# Patient Record
Sex: FEMALE | Race: WHITE | NOT HISPANIC OR LATINO | Employment: FULL TIME | URBAN - METROPOLITAN AREA
[De-identification: names, ages, dates, MRNs, and addresses within clinical notes are randomized per-mention and may not be internally consistent; named-entity substitution may affect disease eponyms.]

---

## 2019-04-17 ENCOUNTER — OFFICE VISIT (OUTPATIENT)
Dept: PULMONOLOGY | Facility: CLINIC | Age: 49
End: 2019-04-17

## 2019-04-17 VITALS
HEIGHT: 65 IN | SYSTOLIC BLOOD PRESSURE: 120 MMHG | OXYGEN SATURATION: 98 % | WEIGHT: 192 LBS | BODY MASS INDEX: 31.99 KG/M2 | HEART RATE: 64 BPM | DIASTOLIC BLOOD PRESSURE: 88 MMHG | TEMPERATURE: 99 F

## 2019-04-17 DIAGNOSIS — R91.8 MULTIPLE PULMONARY NODULES DETERMINED BY COMPUTED TOMOGRAPHY OF LUNG: ICD-10-CM

## 2019-04-17 DIAGNOSIS — R91.8 ABNORMAL CT LUNG SCREENING: Primary | ICD-10-CM

## 2019-04-17 PROCEDURE — 94729 DIFFUSING CAPACITY: CPT | Performed by: INTERNAL MEDICINE

## 2019-04-17 PROCEDURE — 94375 RESPIRATORY FLOW VOLUME LOOP: CPT | Performed by: INTERNAL MEDICINE

## 2019-04-17 PROCEDURE — 99203 OFFICE O/P NEW LOW 30 MIN: CPT | Performed by: INTERNAL MEDICINE

## 2019-04-17 PROCEDURE — 94726 PLETHYSMOGRAPHY LUNG VOLUMES: CPT | Performed by: INTERNAL MEDICINE

## 2019-04-17 RX ORDER — CYANOCOBALAMIN 1000 UG/ML
INJECTION, SOLUTION INTRAMUSCULAR; SUBCUTANEOUS
Refills: 0 | COMMUNITY
Start: 2019-04-03 | End: 2019-12-10

## 2019-04-17 RX ORDER — UBIDECARENONE 100 MG
100 CAPSULE ORAL DAILY
COMMUNITY
End: 2019-07-22

## 2019-04-17 RX ORDER — TRAZODONE HYDROCHLORIDE 150 MG/1
150 TABLET ORAL
Refills: 1 | COMMUNITY
Start: 2019-04-01

## 2019-04-17 RX ORDER — MELATONIN
1000 AS NEEDED
COMMUNITY

## 2019-04-17 RX ORDER — ERGOCALCIFEROL 1.25 MG/1
50000 CAPSULE ORAL WEEKLY
Refills: 0 | COMMUNITY
Start: 2019-03-19 | End: 2019-04-17

## 2019-04-17 RX ORDER — AMOXICILLIN 875 MG/1
875 TABLET, COATED ORAL 2 TIMES DAILY
COMMUNITY
End: 2019-07-22

## 2019-04-17 NOTE — PROGRESS NOTES
Initial Pulmonary Consult Note    Subjective   Reason for consultation/Chief Complaint: Abnormal CT Chest    Italia Nagy is a 49 y.o. female is being seen for consultation today at the request of RODRIGUEZ Mccoy    History of Present Illness    Ms. Nagy is a 50yo F who is referred for an abnormal CT scan of the chest. She was experiencing right upper quadrant abdominal pain in early March and underwent a CT abdomen/pelvis. This showed multiple 2mm pulmonary nodules as well as a splenic calcification. She then underwent a dedicated CT chest which showed a few scattered 2-3mm calcified granulomas bilaterally. She is referred to Pulmonary Clinic for further evaluation.     She does not have any respiratory symptoms. She denies any shortness of breath, weight loss, cough or hemoptysis. She is a previous smoker. She smoked for approximately 3 years while in high school and quit > 20 years ago. She does have second hand smoke exposure. Her dad was diagnosed with COPD.       Active Ambulatory Problems     Diagnosis Date Noted   • Multiple pulmonary nodules determined by computed tomography of lung 04/17/2019     Resolved Ambulatory Problems     Diagnosis Date Noted   • No Resolved Ambulatory Problems     No Additional Past Medical History       No past surgical history on file.    No family history on file.    Social History     Socioeconomic History   • Marital status: Single     Spouse name: Not on file   • Number of children: Not on file   • Years of education: Not on file   • Highest education level: Not on file   Tobacco Use   • Smoking status: Former Smoker     Types: Cigarettes   • Smokeless tobacco: Never Used   • Tobacco comment: quit 25 years ago   Substance and Sexual Activity   • Alcohol use: Yes     Comment: beer, not weekly   • Drug use: No   • Sexual activity: Defer       No Known Allergies    Current Outpatient Medications   Medication Sig Dispense Refill   • amoxicillin (AMOXIL) 875 MG tablet  "Take 875 mg by mouth 2 (Two) Times a Day.     • cholecalciferol (VITAMIN D3) 1000 units tablet Take 1,000 Units by mouth Daily.     • coenzyme Q10 100 MG capsule Take 100 mg by mouth Daily.     • cyanocobalamin 1000 MCG/ML injection INJECT 1 ML IM ONCE WEEKLY X 4 WEEKS THEN MONTHLY X 3, DISPENSE NEEDLES AND SYRINGES  0   • traZODone (DESYREL) 150 MG tablet Take 150 mg by mouth every night at bedtime.  1     No current facility-administered medications for this visit.        Review of Systems   Constitutional: Negative.    HENT: Negative.    Eyes: Negative.    Respiratory: Negative.    Cardiovascular: Negative.    Gastrointestinal: Negative.    Endocrine: Negative.    Genitourinary: Negative.    Musculoskeletal: Negative.    Skin: Negative.    Allergic/Immunologic: Positive for environmental allergies.   Neurological: Negative.    Hematological: Negative.    Psychiatric/Behavioral: Negative.          Objective   Blood pressure 120/88, pulse 64, temperature 99 °F (37.2 °C), height 163.8 cm (64.5\"), weight 87.1 kg (192 lb), SpO2 98 %.  Physical Exam   Constitutional: She is oriented to person, place, and time. She appears well-developed and well-nourished. No distress.   HENT:   Head: Normocephalic and atraumatic.   Mouth/Throat: Oropharynx is clear and moist.   Eyes: Conjunctivae are normal. Pupils are equal, round, and reactive to light. No scleral icterus.   Neck: Normal range of motion. Neck supple. No tracheal deviation present. No thyromegaly present.   Cardiovascular: Normal rate, regular rhythm and normal heart sounds.   Pulmonary/Chest: Effort normal and breath sounds normal. No respiratory distress.   Abdominal: Soft. Bowel sounds are normal. There is no tenderness.   Musculoskeletal: Normal range of motion. She exhibits no edema.   Lymphadenopathy:     She has no cervical adenopathy.   Neurological: She is alert and oriented to person, place, and time. She exhibits normal muscle tone. Coordination normal. "   Skin: Skin is warm and dry. No rash noted. No erythema.   Psychiatric: She has a normal mood and affect. Her speech is normal and behavior is normal. Judgment normal.   Nursing note and vitals reviewed.      PFTs:  Performed in clinic today and personally reviewed.   Normal Spirometry.  Normal Lung volumes.  Normal DLCO.     Imaging:  I was able to review her CT chest from March 2019. There are a few 2-3mm scattered calcified pulmonary nodules consistent with granuloma. She has 1 splenic calcification.     Assessment/Plan   Italia was seen today for abnormal imaging.    Diagnoses and all orders for this visit:    Abnormal CT lung screening  -     Pulmonary Function Test    Multiple pulmonary nodules determined by computed tomography of lung        Discussion:  Ms. Nagy is a 48yo F who is referred for pulmonary nodules.     1. Calcified Granulomas  - The small pulmonary nodules are consistent with calcified granulomas likely secondary to histoplasma exposure.   - These are benign and do not require further follow up.     2. Splenic Calcification  - This is also likely secondary to Histo and does not require further follow up.       There is no need for further CT scans of the chest to follow up. She may return to Pulmonary Clinic as needed.         Leeanne JAMES Case, DO  Pulmonary and Critical Care Medicine  Note Electronically Signed

## 2019-07-22 ENCOUNTER — OFFICE VISIT (OUTPATIENT)
Dept: FAMILY MEDICINE CLINIC | Facility: CLINIC | Age: 49
End: 2019-07-22

## 2019-07-22 VITALS
HEART RATE: 60 BPM | HEIGHT: 64 IN | BODY MASS INDEX: 33.46 KG/M2 | WEIGHT: 196 LBS | DIASTOLIC BLOOD PRESSURE: 70 MMHG | SYSTOLIC BLOOD PRESSURE: 110 MMHG | TEMPERATURE: 98.3 F

## 2019-07-22 DIAGNOSIS — F41.9 ANXIETY: Primary | ICD-10-CM

## 2019-07-22 DIAGNOSIS — E66.09 CLASS 1 OBESITY DUE TO EXCESS CALORIES WITH BODY MASS INDEX (BMI) OF 33.0 TO 33.9 IN ADULT, UNSPECIFIED WHETHER SERIOUS COMORBIDITY PRESENT: ICD-10-CM

## 2019-07-22 PROBLEM — B39.9 HISTOPLASMOSIS: Status: ACTIVE | Noted: 2019-07-22

## 2019-07-22 PROCEDURE — 99204 OFFICE O/P NEW MOD 45 MIN: CPT | Performed by: NURSE PRACTITIONER

## 2019-07-22 RX ORDER — BUPROPION HYDROCHLORIDE 75 MG/1
75 TABLET ORAL 2 TIMES DAILY
Qty: 60 TABLET | Refills: 3 | Status: SHIPPED | OUTPATIENT
Start: 2019-07-22 | End: 2019-12-10

## 2019-07-22 RX ORDER — HYDROXYZINE HYDROCHLORIDE 25 MG/1
25 TABLET, FILM COATED ORAL EVERY 6 HOURS PRN
Qty: 100 TABLET | Refills: 5 | Status: SHIPPED | OUTPATIENT
Start: 2019-07-22

## 2019-07-22 NOTE — PATIENT INSTRUCTIONS
Mediterranean Diet  A Mediterranean diet refers to food and lifestyle choices that are based on the traditions of countries located on the [place]. This way of eating has been shown to help prevent certain conditions and improve outcomes for people who have chronic diseases, like kidney disease and heart disease.  What are tips for following this plan?  Lifestyle  · Cook and eat meals together with your family, when possible.  · Drink enough fluid to keep your urine clear or pale yellow.  · Be physically active every day. This includes:  ? Aerobic exercise like running or swimming.  ? Leisure activities like gardening, walking, or housework.  · Get 7-8 hours of sleep each night.  · If recommended by your health care provider, drink red wine in moderation. This means 1 glass a day for nonpregnant women and 2 glasses a day for men. A glass of wine equals 5 oz (150 mL).  Reading food labels  · Check the serving size of packaged foods. For foods such as rice and pasta, the serving size refers to the amount of cooked product, not dry.  · Check the total fat in packaged foods. Avoid foods that have saturated fat or trans fats.  · Check the ingredients list for added sugars, such as corn syrup.  Shopping  · At the grocery store, buy most of your food from the areas near the walls of the store. This includes:  ? Fresh fruits and vegetables (produce).  ? Grains, beans, nuts, and seeds. Some of these may be available in unpackaged forms or large amounts (in bulk).  ? Fresh seafood.  ? Poultry and eggs.  ? Low-fat dairy products.  · Buy whole ingredients instead of prepackaged foods.  · Buy fresh fruits and vegetables in-season from local farmers markets.  · Buy frozen fruits and vegetables in resealable bags.  · If you do not have access to quality fresh seafood, buy precooked frozen shrimp or canned fish, such as tuna, salmon, or sardines.  · Buy small amounts of raw or cooked vegetables, salads, or olives from the  deli or salad bar at your store.  · Stock your pantry so you always have certain foods on hand, such as olive oil, canned tuna, canned tomatoes, rice, pasta, and beans.  Cooking  · Cook foods with extra-virgin olive oil instead of using butter or other vegetable oils.  · Have meat as a side dish, and have vegetables or grains as your main dish. This means having meat in small portions or adding small amounts of meat to foods like pasta or stew.  · Use beans or vegetables instead of meat in common dishes like chili or lasagna.  · [place] with different cooking methods. Try roasting or broiling vegetables instead of steaming or sautéeing them.  · Add frozen vegetables to soups, stews, pasta, or rice.  · Add nuts or seeds for added healthy fat at each meal. You can add these to yogurt, salads, or vegetable dishes.  · Marinate fish or vegetables using olive oil, lemon juice, garlic, and fresh herbs.  Meal planning  · Plan to eat 1 vegetarian meal one day each week. Try to work up to 2 vegetarian meals, if possible.  · Eat seafood 2 or more times a week.  · Have healthy snacks readily available, such as:  ? Vegetable sticks with hummus.  ? Greek yogurt.  ? Fruit and nut trail mix.  · Eat balanced meals throughout the week. This includes:  ? Fruit: 2-3 servings a day  ? Vegetables: 4-5 servings a day  ? Low-fat dairy: 2 servings a day  ? Fish, poultry, or lean meat: 1 serving a day  ? Beans and legumes: 2 or more servings a week  ? Nuts and seeds: 1-2 servings a day  ? Whole grains: 6-8 servings a day  ? Extra-virgin olive oil: 3-4 servings a day  · Limit red meat and sweets to only a few servings a month  What are my food choices?  · Mediterranean diet  ? Recommended  ? Grains: Whole-grain pasta. Brown rice. Bulgar wheat. Polenta. Couscous. Whole-wheat bread. Oatmeal. Quinoa.  ? Vegetables: Artichokes. Beets. Broccoli. Cabbage. Carrots. Eggplant. Green beans. Chard. Kale. Spinach. Onions. Leeks. Peas. Squash.  Tomatoes. Peppers. Radishes.  ? Fruits: Apples. Apricots. Avocado. Berries. Bananas. Cherries. Dates. Figs. Grapes. [place]. Melon. Oranges. Peaches. Plums. Pomegranate.  ? Meats and other protein foods: Beans. Almonds. Sunflower seeds. Pine nuts. Peanuts. Cod. [place]. Scallops. Shrimp. Tuna. Tilapia. Clams. Oysters. Eggs.  ? Dairy: Low-fat milk. Cheese. Greek yogurt.  ? Beverages: Water. Red wine. Herbal tea.  ? Fats and oils: Extra virgin olive oil. Avocado oil. Grape seed oil.  ? Sweets and desserts: Greek yogurt with honey. Baked apples. Poached pears. Trail mix.  ? Seasoning and other foods: Basil. Cilantro. Coriander. Cumin. Mint. Parsley. [place]. Rosemary. Tarragon. Garlic. Oregano. Thyme. Pepper. Balsalmic vinegar. Tahini. Hummus. Tomato sauce. Olives. Mushrooms.  ? Limit these  ? Grains: Prepackaged pasta or rice dishes. Prepackaged cereal with added sugar.  ? Vegetables: Deep fried potatoes (french fries).  ? Fruits: Fruit canned in syrup.  ? Meats and other protein foods: Beef. Pork. Lamb. Poultry with skin. Hot dogs. [place].  ? Dairy: Ice cream. Sour cream. Whole milk.  ? Beverages: Juice. Sugar-sweetened soft drinks. Beer. Liquor and spirits.  ? Fats and oils: Butter. Canola oil. Vegetable oil. Beef fat (tallow). Lard.  ? Sweets and desserts: Cookies. Cakes. Pies. Candy.  ? Seasoning and other foods: Mayonnaise. Premade sauces and marinades.  ? The items listed may not be a complete list. Talk with your dietitian about what dietary choices are right for you.  Summary  · The Mediterranean diet includes both food and lifestyle choices.  · Eat a variety of fresh fruits and vegetables, beans, nuts, seeds, and whole grains.  · Limit the amount of red meat and sweets that you eat.  · Talk with your health care provider about whether it is safe for you to drink red wine in moderation. This means 1 glass a day for nonpregnant women and 2 glasses a day for men. A glass of wine equals 5 oz (150 mL).  This information  is not intended to replace advice given to you by your health care provider. Make sure you discuss any questions you have with your health care provider.  Document Released: 08/10/2017 Document Revised: 09/12/2017 Document Reviewed: 08/10/2017  [employer] Interactive Patient Education © 2019 [employer]

## 2019-07-22 NOTE — PROGRESS NOTES
"Italia Nagy presents today to establish care.    Chief Complaint   Patient presents with   • Establish Care     New patient she is here today to establish care.she is also concerned about weight and anxiety.        KAMILLA Echavarria presents today to establish care.  Concerns today include anxiety and weight management.  She is a patient at the [facility] breast center and wanted to establish care in the [facility] network.  Has a GYN, Dr. Adler.  Up-to-date on Pap.    Anxiety  New.  Worsening.  States that about 6 months ago, anxiety seemed to present itself and has since gotten worse.  About 1 year ago her mother had a stroke which started her symptoms of concern and worry which progressed into daily anxiety.  Now, she is having trouble concentrating and transposing numbers.  She is a  and has to be \"sharp all the time\".  She has never tried any medical management.  Struggles with insomnia, currently taking 300 mg of trazodone every night at bedtime to help her sleep.  States that this helps her fall asleep but not stay asleep.  Wakes up worrying about little things.    Obesity  Chronic.  Worsening.  States that her weight fluctuates constantly.  Has been trending up in the last few years.  She desires weight loss and is interested in medical management.  Has tried fad diets with no success.  Does not exercise regularly.    Of note, pulmonary nodules in the past.  Diagnosed with histoplasmosis.  No pulmonary follow-up was required.    PHQ-2/PHQ-9 Depression Screening 7/22/2019   Little interest or pleasure in doing things 2   Feeling down, depressed, or hopeless 2   Trouble falling or staying asleep, or sleeping too much 3   Feeling tired or having little energy 3   Poor appetite or overeating 3   Feeling bad about yourself - or that you are a failure or have let yourself or your family down 3   Trouble concentrating on things, such as reading the newspaper or watching television 3   Moving or speaking so " slowly that other people could have noticed. Or the opposite - being so fidgety or restless that you have been moving around a lot more than usual 0   Thoughts that you would be better off dead, or of hurting yourself in some way 0   Total Score 19   If you checked off any problems, how difficult have these problems made it for you to do your work, take care of things at home, or get along with other people? Very difficult       Review of Systems   Constitutional: Negative for activity change, fatigue, unexpected weight gain and unexpected weight loss.   HENT: Negative for congestion and ear pain (ear fullness).    Eyes: Negative for blurred vision and visual disturbance.   Respiratory: Negative for chest tightness, shortness of breath and wheezing.    Cardiovascular: Negative for chest pain, palpitations and leg swelling.   Gastrointestinal: Negative for constipation, diarrhea, nausea, vomiting and GERD.   Musculoskeletal: Negative for arthralgias.   Neurological: Negative for dizziness, light-headedness, headache and confusion.   Psychiatric/Behavioral: Positive for sleep disturbance and stress. Negative for depressed mood. The patient is nervous/anxious.    All other systems reviewed and are negative.       Past Medical History:   Diagnosis Date   • Anxiety    • Headache         Past Surgical History:   Procedure Laterality Date   • CHOLECYSTECTOMY     • TUBAL ABDOMINAL LIGATION          Family History   Problem Relation Age of Onset   • Arthritis Mother    • Hyperlipidemia Mother    • Stroke Mother    • Heart attack Father         Social History     Socioeconomic History   • Marital status: Single     Spouse name: Not on file   • Number of children: Not on file   • Years of education: Not on file   • Highest education level: Not on file   Occupational History     Employer: [employer]   Tobacco Use   • Smoking status: Former Smoker     Types: Cigarettes   • Smokeless tobacco: Never Used   •  "Tobacco comment: quit 25 years ago   Substance and Sexual Activity   • Alcohol use: Yes     Comment: beer, not weekly   • Drug use: No   • Sexual activity: Yes     Partners: Male     Birth control/protection: Tubal ligation        Current Outpatient Medications on File Prior to Visit   Medication Sig Dispense Refill   • Chlorcyclizine-Pseudoephed (STAHIST AD) 25-60 MG tablet Take 1 tablet by mouth 2 (Two) Times a Day.     • cholecalciferol (VITAMIN D3) 1000 units tablet Take 1,000 Units by mouth Daily.     • cyanocobalamin 1000 MCG/ML injection INJECT 1 ML IM ONCE WEEKLY X 4 WEEKS THEN MONTHLY X 3, DISPENSE NEEDLES AND SYRINGES  0   • traZODone (DESYREL) 150 MG tablet Take 150 mg by mouth every night at bedtime.  1   • [DISCONTINUED] amoxicillin (AMOXIL) 875 MG tablet Take 875 mg by mouth 2 (Two) Times a Day.     • [DISCONTINUED] coenzyme Q10 100 MG capsule Take 100 mg by mouth Daily.       No current facility-administered medications on file prior to visit.        No Known Allergies     Visit Vitals  /70   Pulse 60   Temp 98.3 °F (36.8 °C)   Ht 162.6 cm (64\")   Wt 88.9 kg (196 lb)   BMI 33.64 kg/m²        Physical Exam   Constitutional: She is oriented to person, place, and time. Vital signs are normal. She appears well-developed and well-nourished. She is obese.  HENT:   Head: Normocephalic.   Right Ear: Hearing, tympanic membrane, external ear and ear canal normal.   Left Ear: Hearing, tympanic membrane, external ear and ear canal normal.   Nose: Nose normal.   Mouth/Throat: Uvula is midline and oropharynx is clear and moist.   Eyes: Lids are normal.   Cardiovascular: Normal rate, regular rhythm, normal heart sounds and intact distal pulses.   Pulmonary/Chest: Effort normal and breath sounds normal.   Abdominal: Soft. Bowel sounds are normal.   Musculoskeletal: Normal range of motion.   Lymphadenopathy:        Head (right side): No submental, no submandibular, no tonsillar, no preauricular, no posterior " auricular and no occipital adenopathy present.        Head (left side): No submental, no submandibular, no tonsillar, no preauricular, no posterior auricular and no occipital adenopathy present.     She has no cervical adenopathy.   Neurological: She is alert and oriented to person, place, and time. GCS eye subscore is 4. GCS verbal subscore is 5. GCS motor subscore is 6.   Skin: Skin is warm, dry and intact.   Psychiatric: She has a normal mood and affect. Her speech is normal and behavior is normal. Judgment and thought content normal.   Nursing note and vitals reviewed.       No results found for this or any previous visit.     Assessment/Plan  Italia was seen today for establish care.    Diagnoses and all orders for this visit:    Anxiety  -     buPROPion (WELLBUTRIN) 75 MG tablet; Take 1 tablet by mouth 2 (Two) Times a Day.  -     hydrOXYzine (ATARAX) 25 MG tablet; Take 1 tablet by mouth Every 6 (Six) Hours As Needed for Anxiety.    Class 1 obesity due to excess calories with body mass index (BMI) of 33.0 to 33.9 in adult, unspecified whether serious comorbidity present  Patient's Body mass index is 33.64 kg/m². BMI is above normal parameters. Recommendations include: educational material, exercise counseling and nutrition counseling.  Advised meeting with a nutritionist.  She declines at this time.  We are hopeful Wellbutrin will help decrease her cravings.  Information on Mediterranean diet given and education provided.      Return in about 1 month (around 8/22/2019).

## 2019-12-09 ENCOUNTER — PREP FOR SURGERY (OUTPATIENT)
Dept: OTHER | Facility: HOSPITAL | Age: 49
End: 2019-12-09

## 2019-12-09 DIAGNOSIS — R10.2 PELVIC PAIN: Primary | ICD-10-CM

## 2019-12-09 RX ORDER — SODIUM CHLORIDE 0.9 % (FLUSH) 0.9 %
10 SYRINGE (ML) INJECTION AS NEEDED
Status: CANCELLED | OUTPATIENT
Start: 2019-12-09

## 2019-12-09 RX ORDER — ACETAMINOPHEN 500 MG
1000 TABLET ORAL ONCE
Status: CANCELLED | OUTPATIENT
Start: 2019-12-09 | End: 2019-12-09

## 2019-12-09 RX ORDER — CEFAZOLIN SODIUM 2 G/100ML
2 INJECTION, SOLUTION INTRAVENOUS ONCE
Status: CANCELLED | OUTPATIENT
Start: 2019-12-09 | End: 2019-12-09

## 2019-12-09 RX ORDER — GABAPENTIN 300 MG/1
600 CAPSULE ORAL ONCE
Status: CANCELLED | OUTPATIENT
Start: 2019-12-09 | End: 2019-12-09

## 2019-12-09 RX ORDER — HEPARIN SODIUM 5000 [USP'U]/ML
5000 INJECTION, SOLUTION INTRAVENOUS; SUBCUTANEOUS ONCE
Status: CANCELLED | OUTPATIENT
Start: 2019-12-09 | End: 2019-12-09

## 2019-12-09 RX ORDER — SODIUM CHLORIDE 0.9 % (FLUSH) 0.9 %
3 SYRINGE (ML) INJECTION EVERY 12 HOURS SCHEDULED
Status: CANCELLED | OUTPATIENT
Start: 2019-12-09

## 2019-12-10 ENCOUNTER — APPOINTMENT (OUTPATIENT)
Dept: PREADMISSION TESTING | Facility: HOSPITAL | Age: 49
End: 2019-12-10

## 2019-12-10 VITALS — HEIGHT: 64 IN | BODY MASS INDEX: 34.93 KG/M2 | WEIGHT: 204.59 LBS

## 2019-12-10 DIAGNOSIS — R10.2 PELVIC PAIN: ICD-10-CM

## 2019-12-10 LAB
B-HCG UR QL: NEGATIVE
BACTERIA UR QL AUTO: ABNORMAL /HPF
BASOPHILS # BLD AUTO: 0.04 10*3/MM3 (ref 0–0.2)
BASOPHILS NFR BLD AUTO: 0.6 % (ref 0–1.5)
BILIRUB UR QL STRIP: NEGATIVE
CLARITY UR: CLEAR
COLOR UR: YELLOW
DEPRECATED RDW RBC AUTO: 45.1 FL (ref 37–54)
EOSINOPHIL # BLD AUTO: 0.22 10*3/MM3 (ref 0–0.4)
EOSINOPHIL NFR BLD AUTO: 3.2 % (ref 0.3–6.2)
ERYTHROCYTE [DISTWIDTH] IN BLOOD BY AUTOMATED COUNT: 12.3 % (ref 12.3–15.4)
GLUCOSE UR STRIP-MCNC: NEGATIVE MG/DL
HBA1C MFR BLD: 5.3 % (ref 4.8–5.6)
HCT VFR BLD AUTO: 43.2 % (ref 34–46.6)
HGB BLD-MCNC: 14 G/DL (ref 12–15.9)
HGB UR QL STRIP.AUTO: NEGATIVE
HYALINE CASTS UR QL AUTO: ABNORMAL /LPF
IMM GRANULOCYTES # BLD AUTO: 0.02 10*3/MM3 (ref 0–0.05)
IMM GRANULOCYTES NFR BLD AUTO: 0.3 % (ref 0–0.5)
KETONES UR QL STRIP: NEGATIVE
LEUKOCYTE ESTERASE UR QL STRIP.AUTO: ABNORMAL
LYMPHOCYTES # BLD AUTO: 1.6 10*3/MM3 (ref 0.7–3.1)
LYMPHOCYTES NFR BLD AUTO: 23.3 % (ref 19.6–45.3)
MCH RBC QN AUTO: 32.2 PG (ref 26.6–33)
MCHC RBC AUTO-ENTMCNC: 32.4 G/DL (ref 31.5–35.7)
MCV RBC AUTO: 99.3 FL (ref 79–97)
MONOCYTES # BLD AUTO: 0.62 10*3/MM3 (ref 0.1–0.9)
MONOCYTES NFR BLD AUTO: 9 % (ref 5–12)
NEUTROPHILS # BLD AUTO: 4.37 10*3/MM3 (ref 1.7–7)
NEUTROPHILS NFR BLD AUTO: 63.6 % (ref 42.7–76)
NITRITE UR QL STRIP: NEGATIVE
NRBC BLD AUTO-RTO: 0 /100 WBC (ref 0–0.2)
PH UR STRIP.AUTO: 7.5 [PH] (ref 5–8)
PLATELET # BLD AUTO: 261 10*3/MM3 (ref 140–450)
PMV BLD AUTO: 8.9 FL (ref 6–12)
PROT UR QL STRIP: NEGATIVE
RBC # BLD AUTO: 4.35 10*6/MM3 (ref 3.77–5.28)
RBC # UR: ABNORMAL /HPF
REF LAB TEST METHOD: ABNORMAL
SP GR UR STRIP: <=1.005 (ref 1–1.03)
SQUAMOUS #/AREA URNS HPF: ABNORMAL /HPF
UROBILINOGEN UR QL STRIP: ABNORMAL
WBC NRBC COR # BLD: 6.87 10*3/MM3 (ref 3.4–10.8)
WBC UR QL AUTO: ABNORMAL /HPF

## 2019-12-10 PROCEDURE — 81025 URINE PREGNANCY TEST: CPT | Performed by: OBSTETRICS & GYNECOLOGY

## 2019-12-10 PROCEDURE — 36415 COLL VENOUS BLD VENIPUNCTURE: CPT

## 2019-12-10 PROCEDURE — 83036 HEMOGLOBIN GLYCOSYLATED A1C: CPT | Performed by: ANESTHESIOLOGY

## 2019-12-10 PROCEDURE — 81001 URINALYSIS AUTO W/SCOPE: CPT | Performed by: OBSTETRICS & GYNECOLOGY

## 2019-12-10 PROCEDURE — 85025 COMPLETE CBC W/AUTO DIFF WBC: CPT | Performed by: OBSTETRICS & GYNECOLOGY

## 2019-12-10 RX ORDER — CYCLOBENZAPRINE HCL 10 MG
5 TABLET ORAL NIGHTLY PRN
COMMUNITY

## 2019-12-10 NOTE — PAT
Patient to apply Chlorhexadine wipes  to surgical area (as instructed) the night before procedure and the AM of procedure. Wipes provided.    Patient instructed to drink 20 ounces (or until full) of Gatorade and it needs to be completed 1 hour before given arrival time for procedure (NO RED Gatorade)    Patient verbalized understanding.    Instructed patient to take two Tylenol extra strength (total of 1000 mg) the night before surgery.    It was noted during Pre Admission Testing that patient was wearing some form of fingernail polish (gel/regular) and/or acrylic/artificial nails.  Patient was told that polish and/or artificial nails must be removed for surgery.  If a patient had recent manicure, and would rather not remove polish or artificial nails. Then the minimum requirement is that the polish/artificial nails must be removed from the middle finger on each hand.  Patient verbalized understanding.    If patient was having surgery on an upper extremity, then the patient was instructed that fingernail polish/artificial fingernails must be removed for surgery.  NO EXCEPTIONS.  Patient verbalized understanding.    If patient was having surgery on a lower extremity, then the patient was instructed that toenail polish on both extremities must be removed for surgery.  NO EXCEPTIONS. Patient verbalized understanding.

## 2019-12-11 ENCOUNTER — ANESTHESIA EVENT (OUTPATIENT)
Dept: PERIOP | Facility: HOSPITAL | Age: 49
End: 2019-12-11

## 2019-12-11 RX ORDER — FAMOTIDINE 10 MG/ML
20 INJECTION, SOLUTION INTRAVENOUS ONCE
Status: CANCELLED | OUTPATIENT
Start: 2019-12-11 | End: 2019-12-11

## 2019-12-11 RX ORDER — SODIUM CHLORIDE 0.9 % (FLUSH) 0.9 %
10 SYRINGE (ML) INJECTION EVERY 12 HOURS SCHEDULED
Status: CANCELLED | OUTPATIENT
Start: 2019-12-11

## 2019-12-11 RX ORDER — SODIUM CHLORIDE 0.9 % (FLUSH) 0.9 %
10 SYRINGE (ML) INJECTION AS NEEDED
Status: CANCELLED | OUTPATIENT
Start: 2019-12-11

## 2019-12-12 ENCOUNTER — HOSPITAL ENCOUNTER (OUTPATIENT)
Facility: HOSPITAL | Age: 49
Setting detail: HOSPITAL OUTPATIENT SURGERY
Discharge: HOME | End: 2019-12-12
Attending: OBSTETRICS & GYNECOLOGY | Admitting: ANESTHESIOLOGY

## 2019-12-12 ENCOUNTER — ANESTHESIA (OUTPATIENT)
Dept: PERIOP | Facility: HOSPITAL | Age: 49
End: 2019-12-12

## 2019-12-12 VITALS
SYSTOLIC BLOOD PRESSURE: 125 MMHG | HEIGHT: 64 IN | OXYGEN SATURATION: 98 % | RESPIRATION RATE: 18 BRPM | HEART RATE: 66 BPM | BODY MASS INDEX: 34.83 KG/M2 | DIASTOLIC BLOOD PRESSURE: 82 MMHG | TEMPERATURE: 97.8 F | WEIGHT: 204 LBS

## 2019-12-12 DIAGNOSIS — R10.2 PELVIC PAIN: Primary | ICD-10-CM

## 2019-12-12 LAB
B-HCG UR QL: NEGATIVE
INTERNAL NEGATIVE CONTROL: NEGATIVE
INTERNAL POSITIVE CONTROL: POSITIVE
Lab: NORMAL

## 2019-12-12 PROCEDURE — 25010000002 PROPOFOL 10 MG/ML EMULSION: Performed by: NURSE ANESTHETIST, CERTIFIED REGISTERED

## 2019-12-12 PROCEDURE — 88307 TISSUE EXAM BY PATHOLOGIST: CPT | Performed by: OBSTETRICS & GYNECOLOGY

## 2019-12-12 PROCEDURE — 25010000002 KETOROLAC TROMETHAMINE PER 15 MG: Performed by: NURSE ANESTHETIST, CERTIFIED REGISTERED

## 2019-12-12 PROCEDURE — 25010000002 FENTANYL CITRATE (PF) 100 MCG/2ML SOLUTION: Performed by: ANESTHESIOLOGY

## 2019-12-12 PROCEDURE — 25010000003 CEFAZOLIN IN DEXTROSE 2-4 GM/100ML-% SOLUTION: Performed by: OBSTETRICS & GYNECOLOGY

## 2019-12-12 PROCEDURE — 25010000002 HEPARIN (PORCINE) PER 1000 UNITS: Performed by: OBSTETRICS & GYNECOLOGY

## 2019-12-12 PROCEDURE — 81025 URINE PREGNANCY TEST: CPT | Performed by: ANESTHESIOLOGY

## 2019-12-12 PROCEDURE — 25010000002 ONDANSETRON PER 1 MG: Performed by: NURSE ANESTHETIST, CERTIFIED REGISTERED

## 2019-12-12 PROCEDURE — 25010000002 DEXAMETHASONE PER 1 MG: Performed by: NURSE ANESTHETIST, CERTIFIED REGISTERED

## 2019-12-12 PROCEDURE — G0378 HOSPITAL OBSERVATION PER HR: HCPCS

## 2019-12-12 PROCEDURE — 25010000002 FENTANYL CITRATE (PF) 100 MCG/2ML SOLUTION: Performed by: NURSE ANESTHETIST, CERTIFIED REGISTERED

## 2019-12-12 PROCEDURE — 25010000002 NEOSTIGMINE 10 MG/10ML SOLUTION: Performed by: NURSE ANESTHETIST, CERTIFIED REGISTERED

## 2019-12-12 RX ORDER — FAMOTIDINE 20 MG/1
20 TABLET, FILM COATED ORAL ONCE
Status: COMPLETED | OUTPATIENT
Start: 2019-12-12 | End: 2019-12-12

## 2019-12-12 RX ORDER — DEXAMETHASONE SODIUM PHOSPHATE 4 MG/ML
INJECTION, SOLUTION INTRA-ARTICULAR; INTRALESIONAL; INTRAMUSCULAR; INTRAVENOUS; SOFT TISSUE AS NEEDED
Status: DISCONTINUED | OUTPATIENT
Start: 2019-12-12 | End: 2019-12-12 | Stop reason: SURG

## 2019-12-12 RX ORDER — NEOSTIGMINE METHYLSULFATE 1 MG/ML
INJECTION, SOLUTION INTRAVENOUS AS NEEDED
Status: DISCONTINUED | OUTPATIENT
Start: 2019-12-12 | End: 2019-12-12 | Stop reason: SURG

## 2019-12-12 RX ORDER — ROCURONIUM BROMIDE 10 MG/ML
INJECTION, SOLUTION INTRAVENOUS AS NEEDED
Status: DISCONTINUED | OUTPATIENT
Start: 2019-12-12 | End: 2019-12-12 | Stop reason: SURG

## 2019-12-12 RX ORDER — ACETAMINOPHEN 500 MG
1000 TABLET ORAL ONCE
Status: COMPLETED | OUTPATIENT
Start: 2019-12-12 | End: 2019-12-12

## 2019-12-12 RX ORDER — OXYCODONE HYDROCHLORIDE 5 MG/1
5 TABLET ORAL EVERY 4 HOURS PRN
Qty: 15 TABLET | Refills: 0 | Status: SHIPPED | OUTPATIENT
Start: 2019-12-12

## 2019-12-12 RX ORDER — MELOXICAM 7.5 MG/1
7.5 TABLET ORAL DAILY
Qty: 30 TABLET | Refills: 0 | Status: SHIPPED | OUTPATIENT
Start: 2019-12-12 | End: 2020-01-11

## 2019-12-12 RX ORDER — KETOROLAC TROMETHAMINE 30 MG/ML
INJECTION, SOLUTION INTRAMUSCULAR; INTRAVENOUS AS NEEDED
Status: DISCONTINUED | OUTPATIENT
Start: 2019-12-12 | End: 2019-12-12 | Stop reason: SURG

## 2019-12-12 RX ORDER — POLYETHYLENE GLYCOL 3350 17 G/17G
17 POWDER, FOR SOLUTION ORAL DAILY
Qty: 14 EACH | Refills: 0 | Status: SHIPPED | OUTPATIENT
Start: 2019-12-12

## 2019-12-12 RX ORDER — PROPOFOL 10 MG/ML
VIAL (ML) INTRAVENOUS AS NEEDED
Status: DISCONTINUED | OUTPATIENT
Start: 2019-12-12 | End: 2019-12-12 | Stop reason: SURG

## 2019-12-12 RX ORDER — GLYCOPYRROLATE 0.2 MG/ML
INJECTION INTRAMUSCULAR; INTRAVENOUS AS NEEDED
Status: DISCONTINUED | OUTPATIENT
Start: 2019-12-12 | End: 2019-12-12 | Stop reason: SURG

## 2019-12-12 RX ORDER — LIDOCAINE HYDROCHLORIDE 40 MG/ML
SOLUTION TOPICAL AS NEEDED
Status: DISCONTINUED | OUTPATIENT
Start: 2019-12-12 | End: 2019-12-12 | Stop reason: SURG

## 2019-12-12 RX ORDER — SODIUM CHLORIDE 9 MG/ML
INJECTION, SOLUTION INTRAVENOUS AS NEEDED
Status: DISCONTINUED | OUTPATIENT
Start: 2019-12-12 | End: 2019-12-12 | Stop reason: HOSPADM

## 2019-12-12 RX ORDER — OXYCODONE HYDROCHLORIDE 5 MG/1
5 TABLET ORAL ONCE AS NEEDED
Status: COMPLETED | OUTPATIENT
Start: 2019-12-12 | End: 2019-12-12

## 2019-12-12 RX ORDER — MIDAZOLAM HYDROCHLORIDE 1 MG/ML
INJECTION INTRAMUSCULAR; INTRAVENOUS
Status: DISCONTINUED | OUTPATIENT
Start: 2019-12-12 | End: 2019-12-12

## 2019-12-12 RX ORDER — LIDOCAINE HYDROCHLORIDE 10 MG/ML
0.5 INJECTION, SOLUTION EPIDURAL; INFILTRATION; INTRACAUDAL; PERINEURAL ONCE AS NEEDED
Status: COMPLETED | OUTPATIENT
Start: 2019-12-12 | End: 2019-12-12

## 2019-12-12 RX ORDER — MAGNESIUM HYDROXIDE 1200 MG/15ML
LIQUID ORAL AS NEEDED
Status: DISCONTINUED | OUTPATIENT
Start: 2019-12-12 | End: 2019-12-12 | Stop reason: HOSPADM

## 2019-12-12 RX ORDER — ONDANSETRON 2 MG/ML
4 INJECTION INTRAMUSCULAR; INTRAVENOUS ONCE AS NEEDED
Status: DISCONTINUED | OUTPATIENT
Start: 2019-12-12 | End: 2019-12-12 | Stop reason: HOSPADM

## 2019-12-12 RX ORDER — LIDOCAINE HYDROCHLORIDE 10 MG/ML
INJECTION, SOLUTION EPIDURAL; INFILTRATION; INTRACAUDAL; PERINEURAL AS NEEDED
Status: DISCONTINUED | OUTPATIENT
Start: 2019-12-12 | End: 2019-12-12 | Stop reason: SURG

## 2019-12-12 RX ORDER — HEPARIN SODIUM 5000 [USP'U]/ML
INJECTION, SOLUTION INTRAVENOUS; SUBCUTANEOUS AS NEEDED
Status: DISCONTINUED | OUTPATIENT
Start: 2019-12-12 | End: 2019-12-12 | Stop reason: HOSPADM

## 2019-12-12 RX ORDER — GABAPENTIN 300 MG/1
600 CAPSULE ORAL ONCE
Status: COMPLETED | OUTPATIENT
Start: 2019-12-12 | End: 2019-12-12

## 2019-12-12 RX ORDER — HYDROMORPHONE HYDROCHLORIDE 1 MG/ML
0.5 INJECTION, SOLUTION INTRAMUSCULAR; INTRAVENOUS; SUBCUTANEOUS
Status: DISCONTINUED | OUTPATIENT
Start: 2019-12-12 | End: 2019-12-12 | Stop reason: HOSPADM

## 2019-12-12 RX ORDER — BUPIVACAINE HYDROCHLORIDE 2.5 MG/ML
INJECTION, SOLUTION EPIDURAL; INFILTRATION; INTRACAUDAL
Status: DISCONTINUED | OUTPATIENT
Start: 2019-12-12 | End: 2019-12-12

## 2019-12-12 RX ORDER — SODIUM CHLORIDE, SODIUM LACTATE, POTASSIUM CHLORIDE, CALCIUM CHLORIDE 600; 310; 30; 20 MG/100ML; MG/100ML; MG/100ML; MG/100ML
9 INJECTION, SOLUTION INTRAVENOUS CONTINUOUS
Status: DISCONTINUED | OUTPATIENT
Start: 2019-12-12 | End: 2019-12-12 | Stop reason: HOSPADM

## 2019-12-12 RX ORDER — BUPIVACAINE HYDROCHLORIDE AND EPINEPHRINE 5; 5 MG/ML; UG/ML
INJECTION, SOLUTION PERINEURAL AS NEEDED
Status: DISCONTINUED | OUTPATIENT
Start: 2019-12-12 | End: 2019-12-12 | Stop reason: HOSPADM

## 2019-12-12 RX ORDER — HEPARIN SODIUM 5000 [USP'U]/ML
5000 INJECTION, SOLUTION INTRAVENOUS; SUBCUTANEOUS ONCE
Status: DISCONTINUED | OUTPATIENT
Start: 2019-12-12 | End: 2019-12-12 | Stop reason: HOSPADM

## 2019-12-12 RX ORDER — ONDANSETRON 2 MG/ML
INJECTION INTRAMUSCULAR; INTRAVENOUS AS NEEDED
Status: DISCONTINUED | OUTPATIENT
Start: 2019-12-12 | End: 2019-12-12 | Stop reason: SURG

## 2019-12-12 RX ORDER — FENTANYL CITRATE 50 UG/ML
INJECTION, SOLUTION INTRAMUSCULAR; INTRAVENOUS
Status: DISCONTINUED | OUTPATIENT
Start: 2019-12-12 | End: 2019-12-12

## 2019-12-12 RX ORDER — FENTANYL CITRATE 50 UG/ML
50 INJECTION, SOLUTION INTRAMUSCULAR; INTRAVENOUS
Status: DISCONTINUED | OUTPATIENT
Start: 2019-12-12 | End: 2019-12-12 | Stop reason: HOSPADM

## 2019-12-12 RX ORDER — FENTANYL CITRATE 50 UG/ML
INJECTION, SOLUTION INTRAMUSCULAR; INTRAVENOUS AS NEEDED
Status: DISCONTINUED | OUTPATIENT
Start: 2019-12-12 | End: 2019-12-12 | Stop reason: SURG

## 2019-12-12 RX ORDER — CEFAZOLIN SODIUM 2 G/100ML
2 INJECTION, SOLUTION INTRAVENOUS ONCE
Status: COMPLETED | OUTPATIENT
Start: 2019-12-12 | End: 2019-12-12

## 2019-12-12 RX ORDER — LABETALOL HYDROCHLORIDE 5 MG/ML
INJECTION, SOLUTION INTRAVENOUS AS NEEDED
Status: DISCONTINUED | OUTPATIENT
Start: 2019-12-12 | End: 2019-12-12 | Stop reason: SURG

## 2019-12-12 RX ORDER — ACETAMINOPHEN 325 MG/1
650 TABLET ORAL EVERY 4 HOURS PRN
Qty: 100 TABLET | Refills: 9 | Status: SHIPPED | OUTPATIENT
Start: 2019-12-12 | End: 2020-12-11

## 2019-12-12 RX ADMIN — FENTANYL CITRATE 50 MCG: 50 INJECTION, SOLUTION INTRAMUSCULAR; INTRAVENOUS at 10:24

## 2019-12-12 RX ADMIN — FENTANYL CITRATE 50 MCG: 0.05 INJECTION, SOLUTION INTRAMUSCULAR; INTRAVENOUS at 11:14

## 2019-12-12 RX ADMIN — PROPOFOL 25 MCG/KG/MIN: 10 INJECTION, EMULSION INTRAVENOUS at 09:18

## 2019-12-12 RX ADMIN — DEXAMETHASONE SODIUM PHOSPHATE 8 MG: 4 INJECTION, SOLUTION INTRAMUSCULAR; INTRAVENOUS at 09:18

## 2019-12-12 RX ADMIN — LABETALOL HYDROCHLORIDE 5 MG: 5 INJECTION, SOLUTION INTRAVENOUS at 09:41

## 2019-12-12 RX ADMIN — FENTANYL CITRATE 50 MCG: 50 INJECTION, SOLUTION INTRAMUSCULAR; INTRAVENOUS at 09:13

## 2019-12-12 RX ADMIN — KETOROLAC TROMETHAMINE 30 MG: 30 INJECTION, SOLUTION INTRAMUSCULAR at 10:19

## 2019-12-12 RX ADMIN — LIDOCAINE HYDROCHLORIDE 50 MG: 10 INJECTION, SOLUTION EPIDURAL; INFILTRATION; INTRACAUDAL; PERINEURAL at 09:13

## 2019-12-12 RX ADMIN — ACETAMINOPHEN 1000 MG: 500 TABLET ORAL at 08:02

## 2019-12-12 RX ADMIN — OXYCODONE HYDROCHLORIDE 5 MG: 5 TABLET ORAL at 12:24

## 2019-12-12 RX ADMIN — GLYCOPYRROLATE 0.1 MG: 0.2 INJECTION, SOLUTION INTRAMUSCULAR; INTRAVENOUS at 09:30

## 2019-12-12 RX ADMIN — GLYCOPYRROLATE 0.4 MG: 0.2 INJECTION, SOLUTION INTRAMUSCULAR; INTRAVENOUS at 10:21

## 2019-12-12 RX ADMIN — ROCURONIUM BROMIDE 40 MG: 10 INJECTION INTRAVENOUS at 09:13

## 2019-12-12 RX ADMIN — FENTANYL CITRATE 50 MCG: 0.05 INJECTION, SOLUTION INTRAMUSCULAR; INTRAVENOUS at 11:19

## 2019-12-12 RX ADMIN — CEFAZOLIN SODIUM 2 G: 2 INJECTION, SOLUTION INTRAVENOUS at 09:18

## 2019-12-12 RX ADMIN — LABETALOL HYDROCHLORIDE 5 MG: 5 INJECTION, SOLUTION INTRAVENOUS at 09:47

## 2019-12-12 RX ADMIN — PROPOFOL 200 MG: 10 INJECTION, EMULSION INTRAVENOUS at 09:13

## 2019-12-12 RX ADMIN — SODIUM CHLORIDE, POTASSIUM CHLORIDE, SODIUM LACTATE AND CALCIUM CHLORIDE 9 ML/HR: 600; 310; 30; 20 INJECTION, SOLUTION INTRAVENOUS at 08:09

## 2019-12-12 RX ADMIN — GABAPENTIN 600 MG: 300 CAPSULE ORAL at 08:02

## 2019-12-12 RX ADMIN — LIDOCAINE HYDROCHLORIDE 1 EACH: 40 SOLUTION TOPICAL at 09:15

## 2019-12-12 RX ADMIN — SODIUM CHLORIDE, POTASSIUM CHLORIDE, SODIUM LACTATE AND CALCIUM CHLORIDE: 600; 310; 30; 20 INJECTION, SOLUTION INTRAVENOUS at 09:08

## 2019-12-12 RX ADMIN — LIDOCAINE HYDROCHLORIDE 0.5 ML: 10 INJECTION, SOLUTION EPIDURAL; INFILTRATION; INTRACAUDAL; PERINEURAL at 08:02

## 2019-12-12 RX ADMIN — ONDANSETRON 4 MG: 2 INJECTION INTRAMUSCULAR; INTRAVENOUS at 10:10

## 2019-12-12 RX ADMIN — FAMOTIDINE 20 MG: 20 TABLET ORAL at 08:02

## 2019-12-12 RX ADMIN — NEOSTIGMINE METHYLSULFATE 3 MG: 1 INJECTION, SOLUTION INTRAVENOUS at 10:21

## 2019-12-12 NOTE — H&P
"[facility] Pre-op    Full history and physical note from office is up to date.  See office note attached.    Gen:  No fever, chills, lesions or changes since last office visit  CV:  No CP, Palps  Resp:  No cough, SOA    BP (!) 165/105 (BP Location: Right arm, Patient Position: Lying)   Pulse 95   Temp 97.8 °F (36.6 °C) (Tympanic)   Resp 18   Ht 162.6 cm (64\")   Wt 92.5 kg (204 lb)   SpO2 97%   BMI 35.02 kg/m²     LAB Results:  Lab Results   Component Value Date    WBC 6.87 12/10/2019    HGB 14.0 12/10/2019    HCT 43.2 12/10/2019    MCV 99.3 (H) 12/10/2019     12/10/2019    NEUTROABS 4.37 12/10/2019     CV:  S1S2 reg no murmur  Resp:  CTAB, unlabored    Cancer Staging (if applicable)  Cancer Patient: __ yes _x_no __unknown__N/A; If yes, clinical stage T:__ N:__M:__, stage group or __N/A    Tiffanie Mcghee, APRN 12/12/2019 8:11 AM  "

## 2019-12-12 NOTE — ANESTHESIA PROCEDURE NOTES
Peripheral Block      Patient reassessed immediately prior to procedure    Patient location during procedure: pre-op  Start time: 12/12/2019 8:50 AM  Stop time: 12/12/2019 9:09 AM  Reason for block: at surgeon's request and post-op pain management  Performed by  Anesthesiologist: Wai Todd Jr., MD  Assisted by: Chloé Whatley RN  Preanesthetic Checklist  Completed: patient identified, site marked, surgical consent, pre-op evaluation, timeout performed, IV checked, risks and benefits discussed and monitors and equipment checked  Prep:  Pt Position: left lateral decubitus  Sterile barriers:cap, gloves, mask and sterile barriers  Prep: ChloraPrep  Patient monitoring: blood pressure monitoring, continuous pulse oximetry and EKG  Procedure  Sedation:yes  Performed under: local infiltration  Guidance:ultrasound guided  Images:still images obtained, printed/placed on chart    Laterality:right  Block Type:interscalene  Injection Technique:catheter  Needle Type:Tuohy and echogenic  Needle Gauge:18 G  Resistance on Injection: none  Catheter Size:20 G (20g)  Cath Depth at skin: 10 cm    Medications Used: midazolam (VERSED) injection, 2 mg  fentaNYL citrate (PF) (SUBLIMAZE) injection, 100 mcg  bupivacaine PF (MARCAINE) 0.25 % injection, 15 mL      Post Assessment  Injection Assessment: negative aspiration for heme, no paresthesia on injection and incremental injection  Patient Tolerance:comfortable throughout block  Complications:no  Additional Notes  Procedure:                 The pt was placed in semifowlers position with a slight tilt of the thorax contralateral to the insertion site.  The Insertion Site was prepped and draped in sterile fashion.  The pt was anesthetized with  IV Sedation( see meds) and  Skin and cutaneous tissue was infiltrated and anesthetized with 1% Lidocaine 3 mls via a 25g needle.  Utilizing ultrasound guidance, a BBraun 2 inch 18 g Contiplex echogenic touhy needle was advanced in-plane.   Hydro dissection of tissue was achieved with Normal saline. Major vessels(carotid and Internal Jugular) where visualized as the brachial plexus was approached at the approximate level of C-7/ T-1.  Cervical 5 and Branches of Cervical 6 nerve roots where visualized and the needle tip was placed posterior at the level of C-6 roots.  LA spread was visualized and injection was made incrementally every 5 mls with aspiration. Injection pressure was normal or little, there was no intraneural injection, no vascular injection.      The BBraun 20 g wire stylet  catheter was then placed under US guidance on the posterior aspect of the Brachial Plexus.  Location of catheter was confirmed with NS injection visualized with US . The tuohy was then removed and the skin was sealed with Skin AFix at catheter insertion site.  Skin was prepped with mastisol and the labeled catheter  was secured with steristrips and a CHG tegaderm. Thank You.

## 2019-12-12 NOTE — ANESTHESIA PREPROCEDURE EVALUATION
Anesthesia Evaluation     Patient summary reviewed and Nursing notes reviewed   no history of anesthetic complications:  NPO Solid Status: > 8 hours  NPO Liquid Status: > 2 hours           Airway   Mallampati: I  TM distance: >3 FB  Neck ROM: full  No difficulty expected  Dental - normal exam     Pulmonary - normal exam    breath sounds clear to auscultation  (-) COPD, asthma, sleep apnea, rhonchi  Cardiovascular - negative cardio ROS and normal exam  Exercise tolerance: good (4-7 METS)    ECG reviewed  Rhythm: regular  Rate: normal    (-) angina, CHF, GONZALEZ, murmur      Neuro/Psych  (+) headaches, psychiatric history Anxiety,     GI/Hepatic/Renal/Endo    (+) obesity,     (-) GERD    Musculoskeletal     Abdominal   (+) obese,    Substance History - negative use     OB/GYN          Other - negative ROS                       Anesthesia Plan    ASA 3     general     intravenous induction     Anesthetic plan, all risks, benefits, and alternatives have been provided, discussed and informed consent has been obtained with: patient.    Plan discussed with CRNA.

## 2019-12-12 NOTE — ANESTHESIA PROCEDURE NOTES
Airway  Urgency: elective    Date/Time: 12/12/2019 9:15 AM  Airway not difficult    General Information and Staff    Patient location during procedure: OR  CRNA: Brooks Andujar CRNA    Indications and Patient Condition  Indications for airway management: airway protection    Preoxygenated: yes  MILS not maintained throughout  Mask difficulty assessment: 2 - vent by mask + OA or adjuvant +/- NMBA    Final Airway Details  Final airway type: endotracheal airway      Successful airway: ETT  Cuffed: yes   Successful intubation technique: direct laryngoscopy  Facilitating devices/methods: intubating stylet  Endotracheal tube insertion site: oral  Blade: Forbes  Blade size: 2  ETT size (mm): 7.0  Cormack-Lehane Classification: grade I - full view of glottis  Placement verified by: chest auscultation and capnometry   Cuff volume (mL): 6  Measured from: lips  ETT/EBT  to lips (cm): 20  Number of attempts at approach: 1    Additional Comments  Negative epigastric sounds, Breath sound equal bilaterally with symmetric chest rise and fall. Atraumatic, dentition and mucosa unchanged

## 2019-12-12 NOTE — OP NOTE
Hysterectomy Procedure Note      Pre-operative Diagnosis: Pelvic pain, post endometrial ablation     Post-operative Diagnosis: Pelvic pain, post endometrial ablation    Operation: Robotic Hysterectomy with bilateral salpingectomy    Surgeon: Veronica Guadalupe MD     Assistants: Tk    Anesthesia: General endotracheal anesthesia    Findings: Normal pelvis    Estimated Blood Loss:  50 mL    Specimens: Uterus with cervix and tubes    Complications:  None    Disposition: PACU - hemodynamically stable.      Procedure Details    The patient was seen in the Holding Room. The risks, benefits, complications, treatment options, and expected outcomes were discussed with the patient. The patient concurred with the proposed plan, giving informed consent. The patient was identified as Italia Nagy and the procedure verified as robotic hysterectomy with bilateral salpingectomy. A Time Out was held and the above information confirmed.    After induction of anesthesia, the patient was draped and prepped in the usual sterile manner. The uterus was sounded and fit with a Rosy-Koh intrauterine manipulator.    The operators gloves were changed.  The supraumbilical area was injected with a dilute marcaine solution.  An 8mm supraumbilical incision was made. Intraperitoneal access was gained with the optical noncutting trocar under direct visualization. CO2 was used to insufflate the abdominopelvic cavity to a pressure of approximately 15 mm Hg. She was placed in maximum Trendelenburg. One additional 8mm port site was placed on the left and two additional 8mm ports sites were placed on the right.     The DaVinci system was docked and the procedure continued at the console. The anatomy was inspected.The ureters were identified bilaterally. The fallopian tube was dissected away from the ovary and the broad ligament with sharp and monopolar dissection.The left uteroovarian ligament was fulguration and transected. The remainder  of the broad ligament, down to and including the round ligament was fulguration and transected. The bladder was dissected across the midline with monopolar, sharp and blunt dissection. The procedure was repeated on the right side. The uterine vessels on the left and right were isolated, fulgurated and transected. A circumferential incision was made along the cervicovaginal junction and the uterus was removed from the vagina. The vaginal cuff was then closed with 2-0 vicryl and 0 vicryl. There was good hemostasis at low pressure.     The DaVinci system was undocked and the procedure was completed at the bedside. The trocars were removed. The skin incisions were closed with 3-0 plain and reinforced with skin glue. The counts were correct x 2. Patient was awakened and taken to recover in stable condition.       Veronica Guadalupe MD  12/12/19  10:42 AM

## 2019-12-12 NOTE — OP NOTE
[facility]  Post Operative Discharge Summary      Patient: Italia Nagy      MR#:[id number]  Admission  Diagnosis: Pelvic pain post ablation.  Discharge Diagnosis: Pelvic pain post ablation    Date of Admission: 12/12/2019  Date of Discharge:  12/12/2019    Procedures:  Robotic hysterectomy with bilateral salpingectomy             Service:  Gynecology    Hospital Course:  Patient underwent  and remained in the hospital for 0 days.  During that time she remained afebrile and hemodynamically stable.  On the day of discharge, she was eating, ambulating and voiding without difficulty.      Labs:    Lab Results (last 24 hours)     Procedure Component Value Units Date/Time    Tissue Pathology Exam [[id number]] Collected:  12/12/19 1004    Specimen:  Tissue from Uterus, Cervix, Bilateral Fallopian Tubes  Updated:  12/12/19 1049    POC Urine Pregnancy Test [[id number]]  (Normal) Collected:  12/12/19 0748    Specimen:  Urine Updated:  12/12/19 0748     HCG, Urine, QL Negative     Comment: 03/21        Lot Number [id number]     Internal Positive Control Positive     Internal Negative Control Negative          Discharge Medications     Discharge Medications      New Medications      Instructions Start Date   acetaminophen 325 MG tablet  Commonly known as:  TYLENOL   650 mg, Oral, Every 4 Hours PRN, Scheduled while awake x 7 days, then as needed      meloxicam 7.5 MG tablet  Commonly known as:  MOBIC   7.5 mg, Oral, Daily, Daily for 7 days following surgery, then as needed.      oxyCODONE 5 MG immediate release tablet  Commonly known as:  ROXICODONE   5 mg, Oral, Every 4 Hours PRN      polyethylene glycol packet  Commonly known as:  MIRALAX   17 g, Oral, Daily, Use daily for 2 weeks following surgery to prevent constipation.  May hold or discontinue for loose stools.         Continue These Medications      Instructions Start Date   cholecalciferol 25 MCG (1000 UT) tablet  Commonly known as:  VITAMIN D3   1,000 Units,  Oral, As Needed      cyclobenzaprine 10 MG tablet  Commonly known as:  FLEXERIL   5 mg, Oral, Nightly PRN      hydrOXYzine 25 MG tablet  Commonly known as:  ATARAX   25 mg, Oral, Every 6 Hours PRN      STAHIST AD 25-60 MG tablet  Generic drug:  Chlorcyclizine-Pseudoephed   1 tablet, Oral, As Needed      traZODone 150 MG tablet  Commonly known as:  DESYREL   150 mg, Oral, Every Night at Bedtime, Can take up to 300mg             Discharge Disposition:  To Home    Discharge Condition:  Stable    Discharge Diet: regular    Activity at Discharge: pelvic rest    Follow-up Appointments  4 weeks.     Veronica Guadalupe MD  12/12/19  10:51 AM

## 2019-12-12 NOTE — ANESTHESIA POSTPROCEDURE EVALUATION
Patient: Italia Nagy    Procedure Summary     Date:  12/12/19 Room / Location:   [facility] [address] [place] [address] [facility]    Anesthesia Start:  0908 Anesthesia Stop:      Procedure:  TOTAL ROBOTIC HYSTERECTOMY, BILATERAL SALPINGECTOMY (N/A Abdomen) Diagnosis:      Surgeon:  Veronica Guadalupe MD Provider:  Wai Todd Jr., MD    Anesthesia Type:  general ASA Status:  3          Anesthesia Type: general    Vitals  No vitals data found for the desired time range.          Post Anesthesia Care and Evaluation    Patient location during evaluation: PACU  Patient participation: complete - patient participated  Level of consciousness: awake and alert  Pain score: 0  Pain management: adequate  Airway patency: patent  Anesthetic complications: No anesthetic complications  PONV Status: none  Cardiovascular status: hemodynamically stable and acceptable  Respiratory status: nonlabored ventilation, acceptable and nasal cannula  Hydration status: acceptable

## 2019-12-16 LAB
CYTO UR: NORMAL
LAB AP CASE REPORT: NORMAL
LAB AP CLINICAL INFORMATION: NORMAL
PATH REPORT.FINAL DX SPEC: NORMAL
PATH REPORT.GROSS SPEC: NORMAL

## 2020-01-03 ENCOUNTER — HOSPITAL ENCOUNTER (EMERGENCY)
Facility: HOSPITAL | Age: 50
Discharge: HOME | End: 2020-01-03
Attending: EMERGENCY MEDICINE | Admitting: EMERGENCY MEDICINE

## 2020-01-03 ENCOUNTER — APPOINTMENT (OUTPATIENT)
Dept: CT IMAGING | Facility: HOSPITAL | Age: 50
End: 2020-01-03

## 2020-01-03 VITALS
WEIGHT: 180 LBS | RESPIRATION RATE: 16 BRPM | TEMPERATURE: 98.2 F | HEART RATE: 84 BPM | HEIGHT: 64 IN | DIASTOLIC BLOOD PRESSURE: 75 MMHG | BODY MASS INDEX: 30.73 KG/M2 | SYSTOLIC BLOOD PRESSURE: 131 MMHG | OXYGEN SATURATION: 100 %

## 2020-01-03 DIAGNOSIS — N93.9 VAGINAL BLEEDING: Primary | ICD-10-CM

## 2020-01-03 DIAGNOSIS — R10.33 PERIUMBILICAL ABDOMINAL PAIN: ICD-10-CM

## 2020-01-03 DIAGNOSIS — Z90.710 HISTORY OF HYSTERECTOMY: ICD-10-CM

## 2020-01-03 LAB
ABO GROUP BLD: NORMAL
BASOPHILS # BLD AUTO: 0.06 10*3/MM3 (ref 0–0.2)
BASOPHILS NFR BLD AUTO: 0.6 % (ref 0–1.5)
BLD GP AB SCN SERPL QL: NEGATIVE
DEPRECATED RDW RBC AUTO: 46.4 FL (ref 37–54)
EOSINOPHIL # BLD AUTO: 0.27 10*3/MM3 (ref 0–0.4)
EOSINOPHIL NFR BLD AUTO: 2.9 % (ref 0.3–6.2)
ERYTHROCYTE [DISTWIDTH] IN BLOOD BY AUTOMATED COUNT: 12.8 % (ref 12.3–15.4)
HCT VFR BLD AUTO: 43.5 % (ref 34–46.6)
HGB BLD-MCNC: 14.1 G/DL (ref 12–15.9)
HOLD SPECIMEN: NORMAL
HOLD SPECIMEN: NORMAL
IMM GRANULOCYTES # BLD AUTO: 0.04 10*3/MM3 (ref 0–0.05)
IMM GRANULOCYTES NFR BLD AUTO: 0.4 % (ref 0–0.5)
LYMPHOCYTES # BLD AUTO: 2.24 10*3/MM3 (ref 0.7–3.1)
LYMPHOCYTES NFR BLD AUTO: 24.1 % (ref 19.6–45.3)
MCH RBC QN AUTO: 32 PG (ref 26.6–33)
MCHC RBC AUTO-ENTMCNC: 32.4 G/DL (ref 31.5–35.7)
MCV RBC AUTO: 98.9 FL (ref 79–97)
MONOCYTES # BLD AUTO: 0.54 10*3/MM3 (ref 0.1–0.9)
MONOCYTES NFR BLD AUTO: 5.8 % (ref 5–12)
NEUTROPHILS # BLD AUTO: 6.16 10*3/MM3 (ref 1.7–7)
NEUTROPHILS NFR BLD AUTO: 66.2 % (ref 42.7–76)
NRBC BLD AUTO-RTO: 0 /100 WBC (ref 0–0.2)
PLATELET # BLD AUTO: 332 10*3/MM3 (ref 140–450)
PMV BLD AUTO: 9.2 FL (ref 6–12)
RBC # BLD AUTO: 4.4 10*6/MM3 (ref 3.77–5.28)
RH BLD: POSITIVE
T&S EXPIRATION DATE: NORMAL
WBC NRBC COR # BLD: 9.31 10*3/MM3 (ref 3.4–10.8)
WHOLE BLOOD HOLD SPECIMEN: NORMAL
WHOLE BLOOD HOLD SPECIMEN: NORMAL

## 2020-01-03 PROCEDURE — 74177 CT ABD & PELVIS W/CONTRAST: CPT

## 2020-01-03 PROCEDURE — 85025 COMPLETE CBC W/AUTO DIFF WBC: CPT

## 2020-01-03 PROCEDURE — 86901 BLOOD TYPING SEROLOGIC RH(D): CPT

## 2020-01-03 PROCEDURE — 86850 RBC ANTIBODY SCREEN: CPT | Performed by: PHYSICIAN ASSISTANT

## 2020-01-03 PROCEDURE — 25010000002 IOPAMIDOL 61 % SOLUTION: Performed by: EMERGENCY MEDICINE

## 2020-01-03 PROCEDURE — 86901 BLOOD TYPING SEROLOGIC RH(D): CPT | Performed by: PHYSICIAN ASSISTANT

## 2020-01-03 PROCEDURE — 86900 BLOOD TYPING SEROLOGIC ABO: CPT | Performed by: PHYSICIAN ASSISTANT

## 2020-01-03 PROCEDURE — 99283 EMERGENCY DEPT VISIT LOW MDM: CPT

## 2020-01-03 PROCEDURE — 86900 BLOOD TYPING SEROLOGIC ABO: CPT

## 2020-01-03 RX ORDER — SODIUM CHLORIDE 0.9 % (FLUSH) 0.9 %
10 SYRINGE (ML) INJECTION AS NEEDED
Status: DISCONTINUED | OUTPATIENT
Start: 2020-01-03 | End: 2020-01-04 | Stop reason: HOSPADM

## 2020-01-03 RX ADMIN — IOPAMIDOL 95 ML: 612 INJECTION, SOLUTION INTRAVENOUS at 21:31

## 2020-01-03 RX ADMIN — SODIUM CHLORIDE 1000 ML: 9 INJECTION, SOLUTION INTRAVENOUS at 20:58

## 2020-01-04 NOTE — ED PROVIDER NOTES
Subjective   Italia Nagy is a 49 y.o.female who presents to the emergency department with complaints of post-op problem. The patient states she had a partial hysterectomy on 12/12/19 with Dr. Guadalupe for non-cancerous reasons. Her post-operative care has been well until today at 16:00. She began having severe vaginal bleeding today. She describes the bleeding as heavier than menses with clots present. She denies recent strenuous activity or heavy lifting. She has appreciated associated abdominal pain in the periumbilical region. She has had nausea and dizziness but denies vomiting and near syncope/syncope. She has had no changes in her urine. She has had prior cholecystectomy.  Patient does not take any aspirin daily or nonsteroidal anti-inflammatory agents or chronic anticoagulation.  There are no other acute complaints at this time.      History provided by:  Patient  Other   Location:  Post-Op Problem  Quality:  S/p hysterectomy  Severity:  Moderate  Onset quality:  Sudden  Duration:  3 hours  Timing:  Constant  Progression:  Unchanged  Chronicity:  New  Context:  Patient had partial hysterectomy on 12/12/19. She developed vaginal bleeding today at 1600  Associated symptoms: abdominal pain (periumbilical) and nausea    Associated symptoms: no vomiting        Review of Systems   Gastrointestinal: Positive for abdominal pain (periumbilical) and nausea. Negative for vomiting.   Genitourinary: Positive for vaginal bleeding. Negative for dysuria, frequency and urgency.        History of partial hysterectomy on 12/12/19   Musculoskeletal: Negative for back pain.   Neurological: Positive for dizziness. Negative for syncope and weakness.   Hematological: Does not bruise/bleed easily.   All other systems reviewed and are negative.      Past Medical History:   Diagnosis Date   • Abnormal Pap smear of cervix    • Anxiety    • Headache    • Hormone replacement therapy    • Insomnia    • Migraine    • Plantar  fasciitis        No Known Allergies    Past Surgical History:   Procedure Laterality Date   • BREAST LUMPECTOMY Left 1989   • CHOLECYSTECTOMY     • ENDOMETRIAL ABLATION     • TOTAL LAPAROSCOPIC HYSTERECTOMY N/A 12/12/2019    Procedure: TOTAL ROBOTIC HYSTERECTOMY, BILATERAL SALPINGECTOMY;  Surgeon: Veronica Guadalupe MD;  Location: [facility];  Service: [facility]   • TUBAL ABDOMINAL LIGATION  2000       Family History   Problem Relation Age of Onset   • Arthritis Mother    • Hyperlipidemia Mother    • Stroke Mother    • Heart attack Father        Social History     Socioeconomic History   • Marital status: Single     Spouse name: Not on file   • Number of children: Not on file   • Years of education: Not on file   • Highest education level: Not on file   Occupational History     [employer]er: [employer]     Comment: teller   Tobacco Use   • Smoking status: Former Smoker     Types: Cigarettes   • Smokeless tobacco: Never Used   • Tobacco comment: quit 25 years ago   Substance and Sexual Activity   • Alcohol use: Yes     Comment: beer, not weekly   • Drug use: No   • Sexual activity: Defer     Partners: Male     Birth control/protection: Tubal ligation   Social History Narrative    Home is in [place].    Single mother, 2 children        Menarche age 13         Objective   Physical Exam   Constitutional: She is oriented to person, place, and time. She appears well-developed and well-nourished.   HENT:   Head: Normocephalic and atraumatic.   Nose: Nose normal.   Eyes: Conjunctivae are normal. No scleral icterus.   Neck: Normal range of motion. Neck supple.   Cardiovascular: Normal rate, regular rhythm and normal heart sounds.   Pulmonary/Chest: Effort normal and breath sounds normal. No respiratory distress.   Abdominal: Soft. Bowel sounds are normal. There is tenderness in the periumbilical area.   Mild tenderness to palpation in the periumbilical region. No suprapubic, flank or CVA tenderness.    Genitourinary: Right adnexum displays no mass, no tenderness and no fullness. Left adnexum displays no mass, no tenderness and no fullness. There is bleeding in the vagina. No vaginal discharge found.   Genitourinary Comments: Large clot noted in vaginal vault, but no active bleeding at this time.  Bimanual exam was within normal limits.   Musculoskeletal: Normal range of motion.   Neurological: She is alert and oriented to person, place, and time.   Skin: Skin is warm and dry.   Psychiatric: She has a normal mood and affect. Her behavior is normal.   Nursing note and vitals reviewed.      Procedures         ED Course  ED Course as of Jan 03 2308 Fri Jan 03, 2020 2238 Paged on-call physician for WYATT Morales.    [TK]   2238 H&H is stable at 14 and 43.  CT scan of the abdomen and pelvis with contrast reveals no acute process in the abdomen or pelvis.  Normal appendix.  Postoperative changes of hysterectomy but no drainable fluid collection in the pelvis.  Mild hepatic steatosis.  I paged Dr. Mak to discuss the case.    [FC]   2242 Discussed the case with Dr. Mak.  She said that since scans looked good and H&H is stable and bleeding has started to slow, patient should be able to be discharged to home since she is hemodynamically stable.  Patient needs to call Dr. Guadalupe on Monday for first available follow-up.  Blood pressure is 131/75.  Heart rate is in the 80s.  Pelvic exam did reveal clot in the vaginal vault but no active bleeding.  I will go discuss the results and plan of treatment with patient and prepare her for discharge to home.    [FC]      ED Course User Index  [FC] Nati Clayton PA-C  [TK] Dontrell Cho     Recent Results (from the past 24 hour(s))   Light Blue Top    Collection Time: 01/03/20  6:56 PM   Result Value Ref Range    Extra Tube hold for add-on    Green Top (Gel)    Collection Time: 01/03/20  6:56 PM   Result Value Ref Range    Extra Tube Hold for add-ons.    Lavender Top     Collection Time: 01/03/20  6:56 PM   Result Value Ref Range    Extra Tube hold for add-on    Gold Top - SST    Collection Time: 01/03/20  6:56 PM   Result Value Ref Range    Extra Tube Hold for add-ons.    CBC Auto Differential    Collection Time: 01/03/20  6:56 PM   Result Value Ref Range    WBC 9.31 3.40 - 10.80 10*3/mm3    RBC 4.40 3.77 - 5.28 10*6/mm3    Hemoglobin 14.1 12.0 - 15.9 g/dL    Hematocrit 43.5 34.0 - 46.6 %    MCV 98.9 (H) 79.0 - 97.0 fL    MCH 32.0 26.6 - 33.0 pg    MCHC 32.4 31.5 - 35.7 g/dL    RDW 12.8 12.3 - 15.4 %    RDW-SD 46.4 37.0 - 54.0 fl    MPV 9.2 6.0 - 12.0 fL    Platelets 332 140 - 450 10*3/mm3    Neutrophil % 66.2 42.7 - 76.0 %    Lymphocyte % 24.1 19.6 - 45.3 %    Monocyte % 5.8 5.0 - 12.0 %    Eosinophil % 2.9 0.3 - 6.2 %    Basophil % 0.6 0.0 - 1.5 %    Immature Grans % 0.4 0.0 - 0.5 %    Neutrophils, Absolute 6.16 1.70 - 7.00 10*3/mm3    Lymphocytes, Absolute 2.24 0.70 - 3.10 10*3/mm3    Monocytes, Absolute 0.54 0.10 - 0.90 10*3/mm3    Eosinophils, Absolute 0.27 0.00 - 0.40 10*3/mm3    Basophils, Absolute 0.06 0.00 - 0.20 10*3/mm3    Immature Grans, Absolute 0.04 0.00 - 0.05 10*3/mm3    nRBC 0.0 0.0 - 0.2 /100 WBC   Type & Screen    Collection Time: 01/03/20  8:55 PM   Result Value Ref Range    ABO Type O     RH type Positive     Antibody Screen Negative     T&S Expiration Date 1/6/2020 11:59:59 PM      Note: In addition to lab results from this visit, the labs listed above may include labs taken at another facility or during a different encounter within the last 24 hours. Please correlate lab times with ED admission and discharge times for further clarification of the services performed during this visit.    CT Abdomen Pelvis With Contrast   Final Result      1. No clearly acute process in the abdomen or pelvis. Normal appendix.   2. Postoperative changes of hysterectomy. No drainable fluid collection in the pelvis.   3. Mild hepatic steatosis.               Signer Name: Jermain  "MD Yaritza    Signed: 1/3/2020 9:46 PM    [facility]: [facility]     [facility]        Vitals:    01/03/20 1826 01/03/20 2109 01/03/20 2113 01/03/20 2155   BP:   109/67 131/75   BP Location:   Right arm Right arm   Patient Position:   Sitting Sitting   Pulse:  89  84   Resp:  16     Temp:   98.2 °F (36.8 °C)    TempSrc:   Oral    SpO2:  100%     Weight: 81.6 kg (180 lb) 81.6 kg (180 lb)     Height: 162.6 cm (64\") 162.6 cm (64\")       Medications   sodium chloride 0.9 % flush 10 mL (has no administration in time range)   sodium chloride 0.9 % flush 10 mL (has no administration in time range)   sodium chloride 0.9 % bolus 1,000 mL (0 mL Intravenous Stopped 1/3/20 2211)   iopamidol (ISOVUE-300) 61 % injection 100 mL (95 mL Intravenous Given 1/3/20 2131)     ECG/EMG Results (last 24 hours)     ** No results found for the last 24 hours. **        No orders to display                     MDM    Final diagnoses:   Vaginal bleeding   History of hysterectomy   Periumbilical abdominal pain       Documentation assistance provided by kadi Cho.  Information recorded by the kadi was done at my direction and has been verified and validated by me.     Dontrell Cho  01/03/20 1950       Nati Clayton PA-C  01/03/20 4810    "

## 2020-01-04 NOTE — DISCHARGE INSTRUCTIONS
Patient having abnormal vaginal bleeding status post partial hysterectomy on 12/12/2019 by Dr. Guadalupe.  Hemoglobin and hematocrit are stable at 14 and

## (undated) DEVICE — MANIP UTER RUMI TP 6.7MMX8CM BLU

## (undated) DEVICE — Device

## (undated) DEVICE — KT POSTN TRENDELENBURG DLX WING PAD TABL STRAP HDRST XL 1P/U

## (undated) DEVICE — OCCL COLPO PNEUMO  STRL

## (undated) DEVICE — OBT BLADLES ENDOWRIST DAVINCI/S 8MM

## (undated) DEVICE — MANIP UTER RUMI 2 KOH EFFICIENT 3.5CM BL

## (undated) DEVICE — SEAL CANN CAM ENDOWRIST DAVINCI/S 8.5MM

## (undated) DEVICE — DRAPE,TOP,102X53,STERILE: Brand: MEDLINE

## (undated) DEVICE — COVER,LIGHT HANDLE,FLX,1/PK: Brand: MEDLINE INDUSTRIES, INC.

## (undated) DEVICE — FLTR PLUMEPORT LAP W/CONN STRL

## (undated) DEVICE — TROCARS: Brand: KII® OPTICAL ACCESS SYSTEM

## (undated) DEVICE — SKIN AFFIX SURG ADHESIVE 72/CS 0.55ML: Brand: MEDLINE

## (undated) DEVICE — ANTIBACTERIAL UNDYED BRAIDED (POLYGLACTIN 910), SYNTHETIC ABSORBABLE SURGICAL SUTURE: Brand: COATED VICRYL

## (undated) DEVICE — GLV SURG DERMASSURE GRN LF PF 7.0

## (undated) DEVICE — [HIGH FLOW INSUFFLATOR,  DO NOT USE IF PACKAGE IS DAMAGED,  KEEP DRY,  KEEP AWAY FROM SUNLIGHT,  PROTECT FROM HEAT AND RADIOACTIVE SOURCES.]: Brand: PNEUMOSURE

## (undated) DEVICE — SUT VIC 2/0 CT2 27IN J269H

## (undated) DEVICE — SUT MNCRYL PLS ANTIB UD 3/0 PS2 27IN

## (undated) DEVICE — TIP COVER ACCESSORY

## (undated) DEVICE — DRP ADAPT ALLY UTER POSTN SYS 1P/U

## (undated) DEVICE — APPL CHLORAPREP W/TINT 26ML BLU

## (undated) DEVICE — ANTIBACTERIAL UNDYED BRAIDED (POLYGLACTIN 910), SYNTHETIC ABSORBABLE SUTURE: Brand: COATED VICRYL

## (undated) DEVICE — PK MAJ GYN DAVINCI 10